# Patient Record
Sex: FEMALE | Race: AMERICAN INDIAN OR ALASKA NATIVE | NOT HISPANIC OR LATINO | Employment: OTHER | URBAN - METROPOLITAN AREA
[De-identification: names, ages, dates, MRNs, and addresses within clinical notes are randomized per-mention and may not be internally consistent; named-entity substitution may affect disease eponyms.]

---

## 2020-06-23 ENCOUNTER — TELEPHONE (OUTPATIENT)
Dept: FAMILY MEDICINE | Facility: CLINIC | Age: 80
End: 2020-06-23

## 2020-06-23 ENCOUNTER — OFFICE VISIT (OUTPATIENT)
Dept: FAMILY MEDICINE | Facility: CLINIC | Age: 80
End: 2020-06-23
Payer: MEDICARE

## 2020-06-23 VITALS
BODY MASS INDEX: 23.04 KG/M2 | HEIGHT: 63 IN | OXYGEN SATURATION: 95 % | HEART RATE: 81 BPM | DIASTOLIC BLOOD PRESSURE: 60 MMHG | RESPIRATION RATE: 20 BRPM | SYSTOLIC BLOOD PRESSURE: 136 MMHG | WEIGHT: 130 LBS | TEMPERATURE: 98 F

## 2020-06-23 DIAGNOSIS — E03.9 HYPOTHYROIDISM, UNSPECIFIED TYPE: ICD-10-CM

## 2020-06-23 DIAGNOSIS — F03.91 DEMENTIA WITH BEHAVIORAL DISTURBANCE, UNSPECIFIED DEMENTIA TYPE: ICD-10-CM

## 2020-06-23 DIAGNOSIS — Z76.89 ENCOUNTER TO ESTABLISH CARE: Primary | ICD-10-CM

## 2020-06-23 DIAGNOSIS — L29.9 ITCHING: ICD-10-CM

## 2020-06-23 DIAGNOSIS — N30.00 ACUTE CYSTITIS WITHOUT HEMATURIA: ICD-10-CM

## 2020-06-23 DIAGNOSIS — E78.5 HYPERLIPIDEMIA, UNSPECIFIED HYPERLIPIDEMIA TYPE: ICD-10-CM

## 2020-06-23 DIAGNOSIS — D64.9 ANEMIA, UNSPECIFIED TYPE: ICD-10-CM

## 2020-06-23 DIAGNOSIS — L30.4 INTERTRIGO: ICD-10-CM

## 2020-06-23 PROCEDURE — 99999 PR PBB SHADOW E&M-EST. PATIENT-LVL III: CPT | Mod: PBBFAC,,, | Performed by: INTERNAL MEDICINE

## 2020-06-23 PROCEDURE — 99213 OFFICE O/P EST LOW 20 MIN: CPT | Mod: PBBFAC,PO | Performed by: INTERNAL MEDICINE

## 2020-06-23 PROCEDURE — 99999 PR PBB SHADOW E&M-EST. PATIENT-LVL III: ICD-10-PCS | Mod: PBBFAC,,, | Performed by: INTERNAL MEDICINE

## 2020-06-23 PROCEDURE — 99203 OFFICE O/P NEW LOW 30 MIN: CPT | Mod: S$PBB,,, | Performed by: INTERNAL MEDICINE

## 2020-06-23 PROCEDURE — 99203 PR OFFICE/OUTPT VISIT, NEW, LEVL III, 30-44 MIN: ICD-10-PCS | Mod: S$PBB,,, | Performed by: INTERNAL MEDICINE

## 2020-06-23 RX ORDER — LORATADINE 10 MG/1
10 TABLET ORAL DAILY
Qty: 90 TABLET | Refills: 3 | Status: SHIPPED | OUTPATIENT
Start: 2020-06-23 | End: 2021-04-05

## 2020-06-23 RX ORDER — FLUCONAZOLE 150 MG/1
TABLET ORAL
Qty: 1 TABLET | Refills: 0 | Status: SHIPPED | OUTPATIENT
Start: 2020-06-23

## 2020-06-23 RX ORDER — LORATADINE 10 MG/1
10 TABLET ORAL DAILY
COMMUNITY
End: 2020-06-23 | Stop reason: SDUPTHER

## 2020-06-23 RX ORDER — DONEPEZIL HYDROCHLORIDE 10 MG/1
TABLET, FILM COATED ORAL
COMMUNITY
Start: 2020-04-15

## 2020-06-23 RX ORDER — NYSTATIN 100000 [USP'U]/G
POWDER TOPICAL 2 TIMES DAILY
Qty: 60 G | Refills: 2 | Status: SHIPPED | OUTPATIENT
Start: 2020-06-23 | End: 2020-08-30 | Stop reason: SDUPTHER

## 2020-06-23 RX ORDER — NAPROXEN 500 MG/1
TABLET ORAL
COMMUNITY
Start: 2020-06-10

## 2020-06-23 NOTE — PROGRESS NOTES
Subjective:       Patient ID: Adri Samayoa is a pleasant 79 y.o. Black or  female patient    Chief Complaint: Follow-up, Establish Care, and Rash (Buttock area)      Patient is a new pt to me at our practice, [facility] one year [facility]. She was recently NOS for 2 appts at our Clinic.     HPI     In the interval:  - [facility] LE (Dr. De Luna).  - Dr. Jett, [facility], on 6/24/2019 to establish care.   - one visit at ED in 12/2019 for venous stasis dermatitis of both LE.  She comes today with her granddaughter. She is in a wheelchair.  She is not really verbal but can smile.    The granddaughter reports that starting 1 month ago the patient has been presenting with skin lesions on the buttocks that have been worsening. She applied topical steroid cream but the lesions have been increasing. The patient does not complain of pain but it seems that it is itchy.  The lesions on both chins are healed.   The granddaughter would like full blood work done today.  She also asks for a refill for antihistamine.      Patient Active Problem List   Diagnosis    Intertrigo    Hypothyroidism    Anemia    Hyperlipidemia        ACTIVE MEDICAL ISSUES:  Documented in Problem List     PAST MEDICAL HISTORY  Documented     PAST SURGICAL HISTORY:  Documented     SOCIAL HISTORY:  Documented     FAMILY HISTORY:  Documented     ALLERGIES AND MEDICATIONS: updated and reviewed.  Documented    Review of Systems   Unable to perform ROS: Dementia       Objective:      Physical Exam  Vitals signs and nursing note reviewed.   Constitutional:       Comments: Frail looking, in a wheelchair, well groomed   Cardiovascular:      Rate and Rhythm: Normal rate and regular rhythm.      Pulses: Normal pulses.      Heart sounds: Normal heart sounds.   Pulmonary:      Effort: Pulmonary effort is normal.      Breath sounds: Normal breath sounds.   Skin:     Comments: Very large area  "of rash on buttocks, wet, reddish, no open wound.          Vitals:    06/23/20 1515   BP: 136/60   BP Location: Left arm   Patient Position: Sitting   BP Method: Medium (Manual)   Pulse: 81   Resp: 20   Temp: 98.2 °F (36.8 °C)   TempSrc: Oral   SpO2: 95%   Weight: 59 kg (130 lb)   Height: 5' 3" (1.6 m)     Body mass index is 23.03 kg/m².    RESULTS: Reviewed labs from last 12 months.    Assessment:       1. Establishing care with new doctor, encounter for    2. Intertrigo    3. Hypothyroidism, unspecified type    4. Anemia, unspecified type    5. Hyperlipidemia, unspecified hyperlipidemia type        Plan:   Adri was seen today for follow-up, establish care and rash.    Diagnoses and all orders for this visit:    Encounter to establish care  -     CBC auto differential; Future  -     CBC auto differential    Pt I saw at some point at [facility], see notes in Epic. She also saw another PCP in the interval. Will reassess her whole situation.    Intertrigo  -     Ambulatory referral/consult to Dermatology; Future  -     fluconazole (DIFLUCAN) 150 MG Tab; Take 1 tablet  -     nystatin (MYCOSTATIN) powder; Apply topically 2 (two) times daily.    Presence of intertrigo on buttocks, widespread. Could not have the patient stand long enough for me to make a picture, Will refer to Dermatology. For now will order one pill of fluconazole (don't want to give too much due to the risk of interactions with Alzheimer meds) and nystatin powder. Advised caregiver to make sure to keep the area as dry as possible, she will let the pt stay on her side in her bed with exposed area to let it dry. No long stays in a chair or wheelchair.    Dementia with behavioral disturbance, unspecified dementia type     Pt assessed by Neurology, on treatment of Aricept, stable.    Hypothyroidism, unspecified type  -     TSH; Future  -     TSH    Will check level.    Anemia, unspecified type  -     CBC auto differential; Future  -     Comprehensive metabolic " panel; Future  -     CBC auto differential  -     Comprehensive metabolic panel    Will reassess.    Hyperlipidemia, unspecified hyperlipidemia type  -     Lipid Panel; Future  -     Lipid Panel    Will check lipid panel.    No follow-ups on file.

## 2020-06-23 NOTE — TELEPHONE ENCOUNTER
----- Message from Suzanne Curtis sent at 6/23/2020  2:55 PM CDT -----  Regarding: Running Late  Name of Who is Calling :     What is the request in detail :    Patient is running late for scheduled appointment and is enroute patient states he/she will be ( 10 minutes ) late looking for location.                                   ## PLEASE CONTACT IF PATIENT NEEDS TO RESCEDULE##                           Can the clinic reply by [facility] : No    What Number to Call Back :  [phone]

## 2020-06-24 PROBLEM — F03.918 DEMENTIA WITH BEHAVIORAL DISTURBANCE: Status: ACTIVE | Noted: 2020-06-24

## 2020-06-25 ENCOUNTER — OFFICE VISIT (OUTPATIENT)
Dept: DERMATOLOGY | Facility: CLINIC | Age: 80
End: 2020-06-25
Payer: MEDICARE

## 2020-06-25 DIAGNOSIS — L30.9 ECZEMA, UNSPECIFIED TYPE: ICD-10-CM

## 2020-06-25 DIAGNOSIS — B35.4 TINEA CORPORIS: Primary | ICD-10-CM

## 2020-06-25 DIAGNOSIS — L30.4 INTERTRIGO: ICD-10-CM

## 2020-06-25 PROCEDURE — 99202 OFFICE O/P NEW SF 15 MIN: CPT | Mod: 25,S$PBB,, | Performed by: DERMATOLOGY

## 2020-06-25 PROCEDURE — 99213 OFFICE O/P EST LOW 20 MIN: CPT | Mod: PBBFAC | Performed by: DERMATOLOGY

## 2020-06-25 PROCEDURE — 99202 PR OFFICE/OUTPT VISIT, NEW, LEVL II, 15-29 MIN: ICD-10-PCS | Mod: 25,S$PBB,, | Performed by: DERMATOLOGY

## 2020-06-25 PROCEDURE — 99999 PR PBB SHADOW E&M-EST. PATIENT-LVL III: ICD-10-PCS | Mod: PBBFAC,,, | Performed by: DERMATOLOGY

## 2020-06-25 PROCEDURE — 99999 PR PBB SHADOW E&M-EST. PATIENT-LVL III: CPT | Mod: PBBFAC,,, | Performed by: DERMATOLOGY

## 2020-06-25 RX ORDER — TERBINAFINE HYDROCHLORIDE 250 MG/1
250 TABLET ORAL DAILY
Qty: 30 TABLET | Refills: 0 | Status: SHIPPED | OUTPATIENT
Start: 2020-06-25 | End: 2020-07-25

## 2020-06-25 NOTE — PATIENT INSTRUCTIONS
Terbinafine that it inhibits P450 CYP pathway, patient is not on any drugs that would cause interference such as warfarin, OCPs, also that to watch the amount of caffeine intake as caffeine is not metabolized as fast as due to terbinafine and stays around longer in patient system.     - Use cerave anti- itch cream as often a needed put in fridge. Ice will help with itch as well.       -Recommend white vinegar: water 1:1 compresses 2x/day followed by cool blow dry and then application of prescription medication, then use zeabsorb AF powder        XEROSIS (DRY SKIN)        1. Definition    Xerosis is the term for dry skin.  We all have a natural oil coating over our skin produced by the skin oil glands.  If this oil is removed, the skin becomes dry which can lead to cracking, which can lead to inflammation.  Xerosis is usually a long-term problem that recurs often, especially in the winter.    2. Cause     Long hot baths or showers can remove our natural oil and lead to xerosis.  One should never take more than one bath or shower a day and for no longer than ten minutes.   Use of harsh soaps such as Zest, Dial, and Ivory can worsen and cause xerosis.   Cold winter weather worsens xerosis because the amount of moisture contained in cold air is much less than the amount of moisture in warm air.    3. Treatment     Treatment is intended to restore the natural oil to your skin.  Keep the skin lubricated.     Do not take more than one bath or shower a day.  Use lukewarm water, not hot.  Hot water dries out the skin.     Use a gentle moisturizing soap such as Cetaphil soap, Oil of Olay, Dove, Basis, Ivory moisture care, Restoraderm cleanser.     When toweling dry, dont rub.  Blot the skin so there is still some water left on the skin.  You should apply a moisturizing cream to all of the skin such as Cerave cream, Cetaphil cream, Restoraderm or Eucerin Original Formula cream.   Alpha hydroxyacid lotions, i.e.,  AmLactin, also work very well for preventing dry skin, but may burn when used on inflamed or reddened skin.     If you like to swim during the winter months, you should not use soap when getting out of the pool.  When you have finished swimming, rinse off the chlorine with cool to warm water.  If this will be the only shower of the day, then you may use Cetaphil or another mild soap to cleanse your skin.  After the shower, apply a moisturizing cream to all of the skin as above.        [address] [place] [address]/ [phone] [email or web address]/ [email or web address]          Terbinafine tablets  What is this medicine?  TERBINAFINE (TER bin a feen) is an antifungal medicine. It is used to treat certain kinds of fungal or yeast infections.  How should I use this medicine?  Take this medicine by mouth with a full glass of water. Follow the directions on the prescription label. You can take this medicine with food or on an empty stomach. Take your medicine at regular intervals. Do not take your medicine more often than directed. Do not skip doses or stop your medicine early even if you feel better. Do not stop taking except on your doctor's advice. Talk to your pediatrician regarding the use of this medicine in children. Special care may be needed.  What side effects may I notice from receiving this medicine?  Side effects that you should report to your doctor or health care professional as soon as possible:  · allergic reactions like skin rash or hives, swelling of the face, lips, or tongue  · changes in vision  · dark urine  · fever or infection  · general ill feeling or flu-like symptoms  · light-colored stools  · loss of appetite, nausea  · redness, blistering, peeling or loosening of the skin, including inside the mouth  · right upper belly pain  · unusually weak or tired  · yellowing of the eyes or skin  Side effects that usually do not require medical attention (report to your doctor or health care  professional if they continue or are bothersome):  · changes in taste  · diarrhea  · hair loss  · muscle or joint pain  · stomach gas  · stomach upset  What may interact with this medicine?  Do not take this medicine with any of the following medications:  · thioridazine  This medicine may also interact with the following medications:  · beta-blockers  · caffeine  · cimetidine  · cyclosporine  · medicines for depression, anxiety, or psychotic disturbances  · medicines for fungal infections like fluconazole and ketoconazole  · medicines for irregular heartbeat like amiodarone, flecainide and propafenone  · rifampin  · warfarin  What if I miss a dose?  If you miss a dose, take it as soon as you can. If it is almost time for your next dose, take only that dose. Do not take double or extra doses.  Where should I keep my medicine?  Keep out of the reach of children.  Store at room temperature below 25 degrees C (77 degrees F). Protect from light. Throw away any unused medicine after the expiration date.  What should I tell my health care provider before I take this medicine?  They need to know if you have any of these conditions:  · drink alcoholic beverages  · kidney disease  · liver disease  · an unusual or allergic reaction to terbinafine, other medicines, foods, dyes, or preservatives  · pregnant or trying to get pregnant  · breast-feeding  What should I watch for while using this medicine?  Visit your doctor or health care provider regularly. Tell your doctor right away if you have nausea or vomiting, loss of appetite, stomach pain on your right upper side, yellow skin, dark urine, light stools, or are over tired. Some fungal infections need many weeks or months of treatment to cure. If you are taking this medicine for a long time, you will need to have important blood work done.  NOTE:This sheet is a summary. It may not cover all possible information. If you have questions about this medicine, talk to your doctor,  pharmacist, or health care provider. Copyright© 2017 [employer]

## 2020-06-25 NOTE — PROGRESS NOTES
Subjective:       Patient ID:  Adri Samayoa is a 79 y.o. female who presents for   Chief Complaint   Patient presents with    Rash     buttock      HPI     Pt present for rash on butock, X 3 weeks, spreading, itching , tx Hydrocortisone     Review of Systems   Constitutional: Negative for fever, chills, weight loss, weight gain, fatigue, night sweats and malaise.   Skin: Negative for daily sunscreen use, activity-related sunscreen use and wears hat.   Hematologic/Lymphatic: Bruises/bleeds easily.        Objective:    Physical Exam   Constitutional: She appears well-developed and well-nourished.   Neurological: She is alert and oriented to person, place, and time.   Psychiatric: She has a normal mood and affect.   Skin:   Areas Examined (abnormalities noted in diagram):   Back Inspection Performed  RUE Inspected  LUE Inspection Performed              Diagram Legend     Erythematous scaling macule/papule c/w actinic keratosis       Vascular papule c/w angioma      Pigmented verrucoid papule/plaque c/w seborrheic keratosis      Yellow umbilicated papule c/w sebaceous hyperplasia      Irregularly shaped tan macule c/w lentigo     1-2 mm smooth white papules consistent with Milia      Movable subcutaneous cyst with punctum c/w epidermal inclusion cyst      Subcutaneous movable cyst c/w pilar cyst      Firm pink to brown papule c/w dermatofibroma      Pedunculated fleshy papule(s) c/w skin tag(s)      Evenly pigmented macule c/w junctional nevus     Mildly variegated pigmented, slightly irregular-bordered macule c/w mildly atypical nevus      Flesh colored to evenly pigmented papule c/w intradermal nevus       Pink pearly papule/plaque c/w basal cell carcinoma      Erythematous hyperkeratotic cursted plaque c/w SCC      Surgical scar with no sign of skin cancer recurrence      Open and closed comedones      Inflammatory papules and pustules      Verrucoid papule consistent consistent with wart     Erythematous  eczematous patches and plaques     Dystrophic onycholytic nail with subungual debris c/w onychomycosis     Umbilicated papule    Erythematous-base heme-crusted tan verrucoid plaque consistent with inflamed seborrheic keratosis     Erythematous Silvery Scaling Plaque c/w Psoriasis     See annotation      Assessment / Plan:        Tinea corporis  -     terbinafine HCL (LAMISIL) 250 mg tablet; Take 1 tablet (250 mg total) by mouth once daily.  Dispense: 30 tablet; Refill: 0    Counseled patient on terbinafine that it inhibits P450 CYP pathway, patient is not on any drugs that would cause interference such as warfarin, OCPs, also that to watch the amount of caffeine intake as caffeine is not metabolized as fast as due to terbinafine and stays around longer in patient system. Handout given and discussed in avs    -Recommend white vinegar: water 1:1 compresses 2x/day followed by cool blow dry and then application of prescription medication, then use zeabsorb AF powder    Intertrigo  -     Ambulatory referral/consult to Dermatology    Eczema, unspecified type  Good skin care regimen discussed including limiting to one bath or shower/day, using lukewarm water with mild soap and moisturizing cream to skin 1 - 2x/day. Brochure was provided and reviewed with patient.  - Use cerave anti- itch cream as often a needed put in fridge. Ice will help with itch as well.                  No follow-ups on file.

## 2020-06-25 NOTE — LETTER
June 25, 2020      Carolina Krishnamurthy MD  [address] [place] [address]           [facility] - Dermatology  [address]  [place] [address]  Phone: [phone]  Fax: [phone]          Patient: Adri Samayoa   MR Number: [id number]   YOB: 1940   Date of Visit: 6/25/2020       Dear Dr. Carolina Krishnamurthy:    Thank you for referring Adri Samayoa to me for evaluation. Attached you will find relevant portions of my assessment and plan of care.    If you have questions, please do not hesitate to call me. I look forward to following Adri Samayoa along with you.    Sincerely,    Vicki Vidales MD    Enclosure  CC:  No Recipients    If you would like to receive this communication electronically, please contact [email or web address] or [phone] to request more information on [employer] Link access.    For providers and/or their staff who would like to refer a patient to [facility], please contact us through our one-stop-shop provider referral line, [facility], at [phone].    If you feel you have received this communication in error or would no longer like to receive these types of communications, please e-mail [email or web address]

## 2020-07-28 DIAGNOSIS — L30.4 INTERTRIGO: ICD-10-CM

## 2020-07-28 DIAGNOSIS — L29.9 ITCHING: ICD-10-CM

## 2020-07-28 RX ORDER — NYSTATIN 100000 [USP'U]/G
POWDER TOPICAL 2 TIMES DAILY
Qty: 60 G | Refills: 2 | Status: CANCELLED | OUTPATIENT
Start: 2020-07-28

## 2020-07-28 RX ORDER — LORATADINE 10 MG/1
10 TABLET ORAL DAILY
Qty: 90 TABLET | Refills: 3 | Status: CANCELLED | OUTPATIENT
Start: 2020-07-28

## 2020-08-03 ENCOUNTER — TELEPHONE (OUTPATIENT)
Dept: FAMILY MEDICINE | Facility: CLINIC | Age: 80
End: 2020-08-03

## 2020-08-03 NOTE — TELEPHONE ENCOUNTER
----- Message from Carolina Krishnamurthy MD sent at 8/3/2020  5:03 PM CDT -----  Regarding: RE: Pt Advice  Contact: Lauri Valdez (daughter)  Can you see what is going on? Thanks  ----- Message -----  From: Sekou Miranda  Sent: 8/3/2020  10:02 AM CDT  To: Carolina Krishnamurthy MD  Subject: Pt Advice                                        Name of Who is Calling: Lauri Valdez (daughter)      What is the request in detail: would like to speak with staff in regards to patient making frequent bath room trips, please advise      Can the clinic reply by [facility]: no      What Number [facility] Call Back [facility]: [phone]

## 2020-08-03 NOTE — TELEPHONE ENCOUNTER
The patient is very anxious and nervous. She is concern about the possibility of Covid. She has complaints of tingling and numbness to her head. We recommended ER. The patient states that she is not going to ER. She was informed to go to a community testing site to be tested if that is her preference.    We would need to have a urine sample. Is it possible? Thanks

## 2020-08-03 NOTE — TELEPHONE ENCOUNTER
Spoke with pts daughter in regards to message. States that pt is going to the restroom more frequent than normal, but states that she is not emptying all the way. States that she has the urge to go, but isn't. Pt denies abdominal pain, denies odor to urine, states that urine is its normal color, and is not cloudy.. pt denies any recent trauma/falls. Denies feeling weak or dizzy, states that her fluid intake is about the same.

## 2020-08-06 ENCOUNTER — TELEPHONE (OUTPATIENT)
Dept: FAMILY MEDICINE | Facility: CLINIC | Age: 80
End: 2020-08-06

## 2020-08-06 NOTE — TELEPHONE ENCOUNTER
What is the request in detail: Meseret, daughter of the patient is requesting a call back from Ms. Whitt. She stated that the patient uses the restroom frequently, but only a little comes out at a time, like a UTI. She does not report any burning. She also stated that she is having a hard time getting the urine specimen, due to the patient having dementia.

## 2020-08-06 NOTE — TELEPHONE ENCOUNTER
I understand it is difficult for the daughter to collect the urine. I could try to give the pt a course of Bactrim for 5 days and see if it helps. Far from ideal but possibly the best option. Would the daughter like me to send this? Did the pt received this antibiotic in the past? I don't see any allergy to Bactrim in her list. Thanks

## 2020-08-06 NOTE — TELEPHONE ENCOUNTER
----- Message from Miguel Dustin sent at 8/6/2020 12:20 PM CDT -----  Regarding: call back  Type: Patient Call Back    Who called:Meseret     What is the request in detail: Meseret, daughter of the patient is requesting a call back from Ms. Whitt. She stated that the patient uses the restroom frequently, but only a little comes out at a time, like a UTI. She does not report any burning. She also stated that she is having a hard time getting the urine specimen, due to the patient having dementia.    Can the clinic reply by [facility]?no    Would the patient rather a call back or a response via [facility]? Call back     Best call back number:[phone]

## 2020-08-07 RX ORDER — SULFAMETHOXAZOLE AND TRIMETHOPRIM 800; 160 MG/1; MG/1
1 TABLET ORAL 2 TIMES DAILY
Qty: 10 TABLET | Refills: 0 | Status: SHIPPED | OUTPATIENT
Start: 2020-08-07 | End: 2020-08-12

## 2020-08-07 NOTE — TELEPHONE ENCOUNTER
Daughter returned call; she would like you to send in the antibiotics to see if it will help, states pt allergic to PCN

## 2020-08-14 DIAGNOSIS — Z11.59 NEED FOR HEPATITIS C SCREENING TEST: ICD-10-CM

## 2020-12-01 DIAGNOSIS — L30.4 INTERTRIGO: Primary | ICD-10-CM

## 2020-12-01 NOTE — TELEPHONE ENCOUNTER
"----- Message from Karli Lauren sent at 12/1/2020 10:08 AM CST -----  Contact: Adri"daughter" [phone]  Type: Patient Call Back    Who called: Adri"daughter"    What is the request in detail: calling to get orders for a wheelchair sent over the insurance company    Can the clinic reply by [facility]? Call back    Would the patient rather a call back or a response via [facility]? Call back    Best call back number: [phone]            "

## 2020-12-01 NOTE — TELEPHONE ENCOUNTER
I thought she had one already as per last visit.  Is it broken? Also, not seen from June, should be seen again this month. Thanks

## 2020-12-02 NOTE — TELEPHONE ENCOUNTER
Per pts daughter they did not get wheelchair from her insurance, it was one family had been paying for, they are requesting orders be sent to DME for pt to get wheelchair through her insurance because they can't afford to pay for it anymore; also requesting Triamcinolone cream to buttocks rash

## 2020-12-03 ENCOUNTER — TELEPHONE (OUTPATIENT)
Dept: FAMILY MEDICINE | Facility: CLINIC | Age: 80
End: 2020-12-03

## 2020-12-03 RX ORDER — TRIAMCINOLONE ACETONIDE 5 MG/G
CREAM TOPICAL 2 TIMES DAILY
Qty: 15 G | Refills: 1 | OUTPATIENT
Start: 2020-12-03

## 2020-12-03 NOTE — TELEPHONE ENCOUNTER
----- Message from Justine Burt sent at 12/3/2020  9:49 AM CST -----  Type:  Patient Returning Call    Who Called:  Daughter- Adri    Who Left Message for Patient:  Marija    Does the patient know what this is regarding?: previous call     Would the patient rather a call back or a response via [facility]?  Call     Best Call Back Number:. [phone]

## 2020-12-03 NOTE — TELEPHONE ENCOUNTER
Per previous message triamcinolone cream is for rash to pts buttocks, she doesn't have a prescription, daughter was using her medication on patient and it helps so she would like medication sent to pharmacy  Pt was last here on 6/23/20, can you use the height and weight from then for wheelchair order, per message from yesterday daughter stated that wheelchair was not provided to pt through her insurance, it is one family has been paying for and they cannot afford it and would like order to be sent to [employer] company for insurance to process

## 2020-12-03 NOTE — TELEPHONE ENCOUNTER
Please see who ordered the triamcinolone first. I cannot find it. Why is it for?For the wheelchair, I put a message to pool yesterday to know if her former wheelchair broke. I would need to know the pt's present weight and height. Thanks

## 2020-12-03 NOTE — TELEPHONE ENCOUNTER
----- Message from Rufina Barry sent at 12/3/2020 12:58 PM CST -----  Type: Patient Call Back    Who called: pt daughter  Adri     What is the request in detail: pt daughter would like to speak with someone about her mothers wheelchair, and also would like to get a refill on Triamcinolone cream .     Can the clinic reply by [facility]? No     Would the patient rather a call back or a response via [facility]?  Call back     Best call back number: [phone]    Additional Information:      Thank You

## 2020-12-09 ENCOUNTER — TELEPHONE (OUTPATIENT)
Dept: FAMILY MEDICINE | Facility: CLINIC | Age: 80
End: 2020-12-09

## 2020-12-09 DIAGNOSIS — F03.91 DEMENTIA WITH BEHAVIORAL DISTURBANCE, UNSPECIFIED DEMENTIA TYPE: Primary | ICD-10-CM

## 2020-12-09 NOTE — TELEPHONE ENCOUNTER
----- Message from Darlene Cantor sent at 12/9/2020  2:59 PM CST -----  Contact: Fadia Rush [phone]  Type:  Patient Returning Call    Who Called: Fadia Rush    Who Left Message for Patient: Not sure     Does the patient know what this is regarding?: Returning call.    Would the patient rather a call back or a response via [facility]? Call back    Best Call Back Number: [phone]

## 2020-12-09 NOTE — TELEPHONE ENCOUNTER
Daughter called back to check on status of below; she is needing cream sent in for rash to buttocks and also order for wheelchair to be sent to DME

## 2020-12-09 NOTE — TELEPHONE ENCOUNTER
I ordered a wheelchair at the best of my knowledge. For the cream, I am not willing to order triamcinolone. Pt seen by me only once in 6/2020, with severe intertrigo. Steroids may worsen the picture. Does she have HH that could assess? Thanks

## 2020-12-09 NOTE — TELEPHONE ENCOUNTER
Pt's daughter states that that particular cream(triamcinolone) was working, but if PCP thinks that this cream is not a good idea, please something else in its place.

## 2021-07-07 ENCOUNTER — PATIENT MESSAGE (OUTPATIENT)
Dept: ADMINISTRATIVE | Facility: HOSPITAL | Age: 81
End: 2021-07-07

## 2021-10-04 ENCOUNTER — PATIENT MESSAGE (OUTPATIENT)
Dept: ADMINISTRATIVE | Facility: HOSPITAL | Age: 81
End: 2021-10-04

## 2022-01-18 ENCOUNTER — PATIENT OUTREACH (OUTPATIENT)
Dept: ADMINISTRATIVE | Facility: HOSPITAL | Age: 82
End: 2022-01-18
Payer: MEDICARE

## 2022-05-02 ENCOUNTER — PES CALL (OUTPATIENT)
Dept: ADMINISTRATIVE | Facility: CLINIC | Age: 82
End: 2022-05-02
Payer: MEDICARE

## 2022-05-23 DIAGNOSIS — L29.9 ITCHING: ICD-10-CM

## 2022-05-23 NOTE — TELEPHONE ENCOUNTER
Refill Routing Note   Medication(s) are not appropriate for processing by [facility] for the following reason(s):      - Patient has not been seen in over 15 months by PCP               Medication reconciliation completed: No     Appointments  past 12m or future 3m with PCP    Date Provider   Last Visit   6/23/2020 Carolina Krishnamurthy MD   Next Visit   Visit date not found Carolina Krishnamurthy MD

## 2022-05-23 NOTE — TELEPHONE ENCOUNTER
No new care gaps identified.  [facility] Embedded Care Gaps. Reference number: [id number]. 5/23/2022   4:14:55 AM JOSEFT

## 2022-05-25 RX ORDER — LORATADINE 10 MG/1
TABLET ORAL
Qty: 90 TABLET | Refills: 0 | Status: SHIPPED | OUTPATIENT
Start: 2022-05-25 | End: 2023-07-06

## 2022-05-30 ENCOUNTER — PATIENT MESSAGE (OUTPATIENT)
Dept: ADMINISTRATIVE | Facility: HOSPITAL | Age: 82
End: 2022-05-30
Payer: MEDICARE

## 2022-08-22 ENCOUNTER — PES CALL (OUTPATIENT)
Dept: ADMINISTRATIVE | Facility: OTHER | Age: 82
End: 2022-08-22
Payer: MEDICARE

## 2023-01-18 ENCOUNTER — PES CALL (OUTPATIENT)
Dept: ADMINISTRATIVE | Facility: OTHER | Age: 83
End: 2023-01-18
Payer: MEDICARE

## 2023-01-30 ENCOUNTER — PATIENT OUTREACH (OUTPATIENT)
Dept: ADMINISTRATIVE | Facility: HOSPITAL | Age: 83
End: 2023-01-30
Payer: MEDICARE

## 2023-05-24 ENCOUNTER — PATIENT OUTREACH (OUTPATIENT)
Dept: ADMINISTRATIVE | Facility: HOSPITAL | Age: 83
End: 2023-05-24
Payer: MEDICARE

## 2023-05-24 NOTE — PROGRESS NOTES
HM and immunization's reviewed and updated. Due for a visit with Dr Krishnamurthy. See [facility] no longer with [facility]. Do not know name spoke with daughter.

## 2023-07-06 DIAGNOSIS — L29.9 ITCHING: ICD-10-CM

## 2023-07-06 RX ORDER — LORATADINE 10 MG/1
TABLET ORAL
Qty: 90 TABLET | Refills: 0 | Status: SHIPPED | OUTPATIENT
Start: 2023-07-06 | End: 2023-09-08

## 2023-07-06 NOTE — TELEPHONE ENCOUNTER
Refill Routing Note   Medication(s) are not appropriate for processing by [facility] for the following reason(s):      Patient not seen by PCP within 15 months  Responsible provider unclear    [facility] action(s):  Defer Care Due:  None identified          Appointments  past 12m or future 3m with PCP    Date Provider   Last Visit   6/23/2020 Carolina Krishnamurthy MD   Next Visit   Visit date not found Carolina Krishnamurthy MD   ED visits in past 90 days: 0        Note composed:2:14 PM 07/06/2023

## 2023-09-07 DIAGNOSIS — L29.9 ITCHING: ICD-10-CM

## 2023-09-08 RX ORDER — LORATADINE 10 MG/1
TABLET ORAL
Qty: 90 TABLET | Refills: 0 | Status: SHIPPED | OUTPATIENT
Start: 2023-09-08 | End: 2023-12-11

## 2023-09-08 NOTE — TELEPHONE ENCOUNTER
Refill Routing Note     Refill Routing Note   Medication(s) are not appropriate for processing by [facility] for the following reason(s):      Responsible provider unclear    [facility] action(s):  Defer Care Due:  None identified     Medication Therapy Plan: No PCP listed      Appointments  past 12m or future 3m with PCP    Date Provider   Last Visit   6/23/2020 Carolina Krishnamurthy MD   Next Visit   Visit date not found Carolina Krishnamurthy MD   ED visits in past 90 days: 0        Note composed:8:44 PM 09/07/2023

## 2023-12-08 DIAGNOSIS — L29.9 ITCHING: ICD-10-CM

## 2023-12-10 NOTE — TELEPHONE ENCOUNTER
Refill Routing Note   Medication(s) are not appropriate for processing by [facility] for the following reason(s):        Patient not seen by provider within 15 months  Responsible provider unclear    [facility] action(s):  Defer               Appointments  past 12m or future 3m with PCP    Date Provider   Last Visit   6/23/2020 Carolina Krishnamurthy MD   Next Visit   Visit date not found Carolina Krishnamurthy MD   ED visits in past 90 days: 0        Note composed:3:33 PM 12/10/2023

## 2023-12-11 RX ORDER — LORATADINE 10 MG/1
TABLET ORAL
Qty: 90 TABLET | Refills: 0 | Status: SHIPPED | OUTPATIENT
Start: 2023-12-11

## 2024-05-13 ENCOUNTER — OFFICE VISIT (OUTPATIENT)
Facility: CLINIC | Age: 84
End: 2024-05-13
Payer: MEDICARE

## 2024-05-13 VITALS
TEMPERATURE: 98 F | OXYGEN SATURATION: 96 % | HEART RATE: 39 BPM | BODY MASS INDEX: 23.04 KG/M2 | WEIGHT: 130.06 LBS | DIASTOLIC BLOOD PRESSURE: 54 MMHG | SYSTOLIC BLOOD PRESSURE: 117 MMHG

## 2024-05-13 DIAGNOSIS — E03.9 ACQUIRED HYPOTHYROIDISM: ICD-10-CM

## 2024-05-13 DIAGNOSIS — F03.918 DEMENTIA WITH BEHAVIORAL DISTURBANCE: ICD-10-CM

## 2024-05-13 DIAGNOSIS — R00.1 BRADYCARDIA, PRE-OPERATIVE CARDIOVASCULAR EXAMINATION: ICD-10-CM

## 2024-05-13 DIAGNOSIS — R00.1 BRADYCARDIA: Primary | ICD-10-CM

## 2024-05-13 DIAGNOSIS — Z01.810 BRADYCARDIA, PRE-OPERATIVE CARDIOVASCULAR EXAMINATION: ICD-10-CM

## 2024-05-13 DIAGNOSIS — F42.4 EXCORIATION (SKIN-PICKING) DISORDER: Chronic | ICD-10-CM

## 2024-05-13 PROCEDURE — 3078F DIAST BP <80 MM HG: CPT | Mod: CPTII,S$GLB,, | Performed by: HOSPITALIST

## 2024-05-13 PROCEDURE — 93010 ELECTROCARDIOGRAM REPORT: CPT | Mod: S$GLB,,, | Performed by: INTERNAL MEDICINE

## 2024-05-13 PROCEDURE — 99205 OFFICE O/P NEW HI 60 MIN: CPT | Mod: S$GLB,,, | Performed by: HOSPITALIST

## 2024-05-13 PROCEDURE — 1126F AMNT PAIN NOTED NONE PRSNT: CPT | Mod: CPTII,S$GLB,, | Performed by: HOSPITALIST

## 2024-05-13 PROCEDURE — 99999 PR PBB SHADOW E&M-EST. PATIENT-LVL III: CPT | Mod: PBBFAC,,, | Performed by: HOSPITALIST

## 2024-05-13 PROCEDURE — 93005 ELECTROCARDIOGRAM TRACING: CPT | Mod: S$GLB,,, | Performed by: HOSPITALIST

## 2024-05-13 PROCEDURE — 3074F SYST BP LT 130 MM HG: CPT | Mod: CPTII,S$GLB,, | Performed by: HOSPITALIST

## 2024-05-13 PROCEDURE — 1158F ADVNC CARE PLAN TLK DOCD: CPT | Mod: CPTII,S$GLB,, | Performed by: HOSPITALIST

## 2024-05-13 RX ORDER — CLOMIPRAMINE HCL 100 %
25 POWDER (GRAM) MISCELLANEOUS
COMMUNITY
Start: 2024-02-19 | End: 2024-05-13 | Stop reason: SDUPTHER

## 2024-05-13 RX ORDER — LATANOPROST 50 UG/ML
1 SOLUTION/ DROPS OPHTHALMIC NIGHTLY
COMMUNITY
Start: 2024-02-19

## 2024-05-13 RX ORDER — DORZOLAMIDE HYDROCHLORIDE AND TIMOLOL MALEATE 20; 5 MG/ML; MG/ML
1 SOLUTION/ DROPS OPHTHALMIC 2 TIMES DAILY
COMMUNITY
Start: 2024-02-26 | End: 2024-05-13

## 2024-05-13 RX ORDER — ALENDRONATE SODIUM 70 MG/1
70 TABLET ORAL
COMMUNITY
End: 2024-05-27 | Stop reason: SDUPTHER

## 2024-05-13 RX ORDER — CHOLECALCIFEROL (VITAMIN D3) 25 MCG
2000 TABLET ORAL DAILY
COMMUNITY
End: 2024-05-15 | Stop reason: SDUPTHER

## 2024-05-13 RX ORDER — MEMANTINE HYDROCHLORIDE 10 MG/1
10 TABLET ORAL 2 TIMES DAILY
COMMUNITY

## 2024-05-13 RX ORDER — CLOMIPRAMINE HCL 100 %
25 POWDER (GRAM) MISCELLANEOUS 2 TIMES DAILY
COMMUNITY
Start: 2024-02-16 | End: 2024-05-13 | Stop reason: SDUPTHER

## 2024-05-13 RX ORDER — CLOMIPRAMINE HYDROCHLORIDE 25 MG/1
25 CAPSULE ORAL DAILY
COMMUNITY
End: 2024-05-27 | Stop reason: SINTOL

## 2024-05-13 NOTE — PROGRESS NOTES
"Primary Care Provider Appointment - [facility]  Len Boyer MD  Initial Visit    Subjective:      Patient ID: Adri Samayoa is a 83 y.o. female with   Past Medical History:   Diagnosis Date    Dementia            Chief Complaint: Annual Exam and Establish Care    Prior to this visit, patient's last encounter with PCP was Visit date not found.    Pt accompanied by daughter Bethany who provides hx.  Was seeing a "Dr. S" at [facility] who left and they didn't want to start w/ a new dr there; this location is more convenient.  Needs medical clearance for dental procedures; insurance changed and prior dentist no longer in-network so they recently established w/ another dentist.  Sees a neurologist Dr. Godinez at [facility] for dementia.  Recently started on clomipramine for OCD w/ some mild improvement but makes her drowsy so not giving BID as prescribed.  On Namenda and Aricept.  Ambulates w/ walker in home, WC out of home.  Some difficulty getting to appointments.  Eating pureed diet; good appetite.  Sleeping well.  Occasional constipation but has PRNs that are effective.  Lives w/ Bethany; other daughter Evangelina helps as well.        4Ms for Medical Decision-Making in Older Adults    Last Completed EAWV: None    MOBILITY:  Get Up and Go:       No data to display              Activities of Daily Living:       No data to display              Whisper Test:       No data to display              Disability Status:       No data to display              Nutrition Screening:       No data to display             Screening Score: 0-7 Malnourished, 8-11 At Risk, 12-14 Normal    MENTATION:   Depression Patient Health Questionnaire:       No data to display              Has Dementia Dx: Yes    Cognitive Function Screening:       No data to display              Cognitive Function Screening Total - Less than 4 = Abnormal,  Greater than or equal to 4 = Normal    MEDICATIONS:  High Risk " Medications:  Total Active Medications: 0  This patient does not have an active medication from one of the medication groupers.    WHAT MATTERS MOST:  Advance Care Planning   ACP Status:   Patient does not have an ACP conversation on file  Living Will: No  Power of : No  [place]POST: No             Social History     Socioeconomic History    Marital status:    Tobacco Use    Smoking status: Unknown    Smokeless tobacco: Never       Review of Systems   Unable to perform ROS: Dementia        Objective:   BP (!) 117/54   Pulse (!) 39   Temp 98.1 °F (36.7 °C) (Oral)   Wt 59 kg (130 lb 1.1 oz)   SpO2 96%   BMI 23.04 kg/m²   Repeat pulse measured by myself 54.    Physical Exam  Vitals reviewed.   Constitutional:       General: She is not in acute distress.     Comments: Pt seen in WC   HENT:      Head: Normocephalic and atraumatic.      Right Ear: External ear normal. There is impacted cerumen.      Left Ear: External ear normal. There is impacted cerumen.      Nose: Nose normal.      Mouth/Throat:      Mouth: Mucous membranes are moist.      Pharynx: Oropharynx is clear.      Comments: Edentulous save for 3 teeth, which are in poor condition.  Eyes:      General: No scleral icterus.     Extraocular Movements: Extraocular movements intact.      Conjunctiva/sclera: Conjunctivae normal.      Pupils: Pupils are equal, round, and reactive to light.   Cardiovascular:      Rate and Rhythm: Regular rhythm. Bradycardia present.      Pulses: Normal pulses.      Heart sounds: Normal heart sounds.   Pulmonary:      Effort: Pulmonary effort is normal.      Breath sounds: Normal breath sounds.   Abdominal:      General: Bowel sounds are normal. There is no distension.      Tenderness: There is no abdominal tenderness. There is no guarding.   Musculoskeletal:      Cervical back: Normal range of motion and neck supple. No rigidity.      Right lower leg: Edema (mild pitting) present.      Left lower leg: Edema (mild  "pitting) present.   Lymphadenopathy:      Cervical: No cervical adenopathy.   Skin:     General: Skin is warm and dry.      Findings: No rash.   Neurological:      Mental Status: She is alert.      Comments: Oriented to self only.  Often needs prompting from daughter to respond to questions and then attends to speaker.  Able to follow some simple commands.  Slight "pill-rolling" tremor of right thumb and 2nd digit noted.      EKG done in office and reviewed personally shows heart rate 36, QTC in low 400s, right bundle branch block, and low amplitude.  No prior tracings available for comparison.      No results found for: "WBC", "HGB", "HCT", "PLT", "CHOL", "TRIG", "HDL", "LDLDIRECT", "ALT", "AST", "NA", "K", "CL", "CREATININE", "BUN", "CO2", "TSH", "PSA", "INR", "GLUF", "HGBA1C", "MICROALBUR"    Current Outpatient Medications on File Prior to Visit   Medication Sig Dispense Refill    alendronate (FOSAMAX) 70 MG tablet Take 70 mg by mouth every 7 days.      ARICEPT 10 mg tablet       clomiPRAMINE (ANAFRANIL) 25 MG Cap Take 25 mg by mouth once daily.      latanoprost 0.005 % ophthalmic solution Place 1 drop into both eyes every evening.      loratadine (CLARITIN) 10 mg tablet TAKE 1 TABLET(10 MG) BY MOUTH EVERY DAY 90 tablet 0    memantine (NAMENDA) 10 MG Tab Take 10 mg by mouth 2 (two) times daily.      naproxen (NAPROSYN) 500 MG tablet       vitamin D (VITAMIN D3) 1000 units Tab Take 2,000 Units by mouth once daily.      [DISCONTINUED] clomipramine HCl (CLOMIPRAMINE, BULK,) 100 % Powd Take 25 mg by mouth 2 (two) times daily.      [DISCONTINUED] clomipramine HCl (CLOMIPRAMINE, BULK,) 100 % Powd 25 mg by Other route.      [DISCONTINUED] dorzolamide-timolol 2-0.5% (COSOPT) 22.3-6.8 mg/mL ophthalmic solution Place 1 drop into both eyes 2 (two) times daily.      [DISCONTINUED] fluconazole (DIFLUCAN) 150 MG Tab Take 1 tablet 1 tablet 0    [DISCONTINUED] NYSTOP powder APPLY TOPICALLY TO THE AFFECTED AREA TWICE DAILY 60 g 2 "     No current facility-administered medications on file prior to visit.           Assessment:   83 y.o. female with multiple co-morbid illnesses here to establish care.    Plan:     Problem List Items Addressed This Visit       Excoriation (skin-picking) disorder (Chronic)     Recently started on clomipramine by neurologist for OCD with perhaps some slight improvement in behavioral symptoms.  Medication interactions and side effects reviewed with patient's daughter, who now feels risk outweighs benefits and wants to wean her off the medication.  She has already been opening the capsules to add to puree-consistency food, so she was advised to decrease the dose to half of a capsule daily for the next 2 weeks then half a capsule every other day for 2 weeks then stop.         Hypothyroidism     Not on levothyroxine.  We will check TSH along with other basic labs to determine if hypothyroidism could possibly be resulting in bradycardia.         Dementia with behavioral disturbance     Patient on both Namenda and Aricept; the former known to have bradycardia as a side effect.  Counseled patient's daughter on limited efficacy of these drugs and increased risk of side effects when used together.  She plans to discuss this at their next appointment with the neurologist.  She appears to be in the moderate stage of disease; somewhat withdrawn but appropriately interactive with prompting.  She is eating well but nutritional intake somewhat limited by need for pureed diet.  Her mobility is on the decline.         Bradycardia, pre-operative cardiovascular examination     Bradycardia confirmed on EKG.  She is asymptomatic.  She does not take any AV yves blocking agents; is possible 1 of her other medications could be contributing to this.  We will try stopping clomipramine has been cautioned above reassess promptly.  Patient's daughter counseled on risk versus benefit of aggressive management of bradycardia which may lead to  pacemaker placement which could be of limited utility and possibly result in harm.          Other Visit Diagnoses       Bradycardia    -  Primary    Relevant Orders    IN OFFICE EKG 12-LEAD (to [place]) (Completed)            Health Maintenance         Date Due Completion Date    TETANUS VACCINE Never done ---    Shingles Vaccine (1 of 2) Never done ---    RSV Vaccine (Age 60+ and Pregnant patients) (1 - 1-dose 60+ series) Never done ---    Pneumococcal Vaccines (Age 65+) (1 of 1 - PCV) Never done ---    COVID-19 Vaccine (3 - 2023-24 season) 09/01/2023 6/14/2021    Lipid Panel 06/24/2024 6/24/2019    Influenza Vaccine (Season Ended) 09/01/2024 ---            Future Appointments   Date Time Provider Department Center   5/13/2024  1:00 PM Len Boyer MD [facility]         Follow up in about 2 weeks (around 5/27/2024). Total clinical care time was 60 min.    Len Boyer MD  [facility]    This note was partially dictated using voice recognition software and although actively proofread during transcription, it is possible some errors in transcription may have been overlooked.

## 2024-05-14 PROBLEM — R00.1 BRADYCARDIA, PRE-OPERATIVE CARDIOVASCULAR EXAMINATION: Status: ACTIVE | Noted: 2024-05-14

## 2024-05-14 PROBLEM — Z01.810 BRADYCARDIA, PRE-OPERATIVE CARDIOVASCULAR EXAMINATION: Status: ACTIVE | Noted: 2024-05-14

## 2024-05-14 PROBLEM — L30.4 INTERTRIGO: Status: RESOLVED | Noted: 2020-06-23 | Resolved: 2024-05-14

## 2024-05-14 PROBLEM — F42.4 EXCORIATION (SKIN-PICKING) DISORDER: Chronic | Status: ACTIVE | Noted: 2024-05-14

## 2024-05-14 LAB
OHS QRS DURATION: 136 MS
OHS QTC CALCULATION: 408 MS

## 2024-05-14 NOTE — ASSESSMENT & PLAN NOTE
Patient on both Namenda and Aricept; the former known to have bradycardia as a side effect.  Counseled patient's daughter on limited efficacy of these drugs and increased risk of side effects when used together.  She plans to discuss this at their next appointment with the neurologist.  She appears to be in the moderate stage of disease; somewhat withdrawn but appropriately interactive with prompting.  She is eating well but nutritional intake somewhat limited by need for pureed diet.  Her mobility is on the decline.

## 2024-05-14 NOTE — ASSESSMENT & PLAN NOTE
Recently started on clomipramine by neurologist for OCD with perhaps some slight improvement in behavioral symptoms.  Medication interactions and side effects reviewed with patient's daughter, who now feels risk outweighs benefits and wants to wean her off the medication.  She has already been opening the capsules to add to puree-consistency food, so she was advised to decrease the dose to half of a capsule daily for the next 2 weeks then half a capsule every other day for 2 weeks then stop.

## 2024-05-15 ENCOUNTER — TELEPHONE (OUTPATIENT)
Dept: PRIMARY CARE CLINIC | Facility: CLINIC | Age: 84
End: 2024-05-15
Payer: MEDICARE

## 2024-05-15 DIAGNOSIS — M81.0 AGE-RELATED OSTEOPOROSIS WITHOUT CURRENT PATHOLOGICAL FRACTURE: Primary | ICD-10-CM

## 2024-05-15 RX ORDER — CHOLECALCIFEROL (VITAMIN D3) 25 MCG
2000 TABLET ORAL DAILY
Qty: 180 TABLET | Refills: 3 | Status: SHIPPED | OUTPATIENT
Start: 2024-05-15 | End: 2025-05-15

## 2024-05-15 NOTE — TELEPHONE ENCOUNTER
----- Message from Gretchen Doll sent at 5/15/2024 10:55 AM CDT -----  Contact: Bethany/Daughter [phone]  Patient has not received the following medication as of today.  Please put in a prescription today as the side effects as starting to come for not taking the over the counter ones.    Requesting an RX refill or new RX.  Is this a refill or new RX: NEW  RX name and strength (copy/paste from chart):  vitamin D (VITAMIN D3) 1000 units Tab  Is this a 30 day or 90 day RX: 90  Pharmacy name and phone # (copy/paste from chart):    [id number] [address] AT [address] [address]  Phone: [phone] Fax: [phone]      Please call and advise.    Thank You

## 2024-05-27 ENCOUNTER — OFFICE VISIT (OUTPATIENT)
Facility: CLINIC | Age: 84
End: 2024-05-27
Payer: MEDICARE

## 2024-05-27 VITALS
OXYGEN SATURATION: 97 % | TEMPERATURE: 99 F | DIASTOLIC BLOOD PRESSURE: 70 MMHG | SYSTOLIC BLOOD PRESSURE: 140 MMHG | HEART RATE: 31 BPM | WEIGHT: 130.06 LBS | BODY MASS INDEX: 23.04 KG/M2

## 2024-05-27 DIAGNOSIS — F03.918 DEMENTIA WITH BEHAVIORAL DISTURBANCE: ICD-10-CM

## 2024-05-27 DIAGNOSIS — L29.9 ITCHING: ICD-10-CM

## 2024-05-27 DIAGNOSIS — M79.601 PAIN OF RIGHT UPPER EXTREMITY: ICD-10-CM

## 2024-05-27 DIAGNOSIS — E78.5 HYPERLIPIDEMIA, UNSPECIFIED HYPERLIPIDEMIA TYPE: ICD-10-CM

## 2024-05-27 DIAGNOSIS — Z79.899 OTHER LONG TERM (CURRENT) DRUG THERAPY: ICD-10-CM

## 2024-05-27 DIAGNOSIS — E03.9 ACQUIRED HYPOTHYROIDISM: ICD-10-CM

## 2024-05-27 DIAGNOSIS — R00.1 BRADYCARDIA: ICD-10-CM

## 2024-05-27 DIAGNOSIS — M81.0 AGE-RELATED OSTEOPOROSIS WITHOUT CURRENT PATHOLOGICAL FRACTURE: Primary | ICD-10-CM

## 2024-05-27 PROCEDURE — 99215 OFFICE O/P EST HI 40 MIN: CPT | Mod: S$GLB,,, | Performed by: HOSPITALIST

## 2024-05-27 PROCEDURE — 3078F DIAST BP <80 MM HG: CPT | Mod: CPTII,S$GLB,, | Performed by: HOSPITALIST

## 2024-05-27 PROCEDURE — 99999 PR PBB SHADOW E&M-EST. PATIENT-LVL IV: CPT | Mod: PBBFAC,,, | Performed by: HOSPITALIST

## 2024-05-27 PROCEDURE — 3077F SYST BP >= 140 MM HG: CPT | Mod: CPTII,S$GLB,, | Performed by: HOSPITALIST

## 2024-05-27 PROCEDURE — 1125F AMNT PAIN NOTED PAIN PRSNT: CPT | Mod: CPTII,S$GLB,, | Performed by: HOSPITALIST

## 2024-05-27 RX ORDER — LORATADINE 10 MG/1
10 TABLET ORAL DAILY
Qty: 90 TABLET | Refills: 3 | Status: SHIPPED | OUTPATIENT
Start: 2024-05-27 | End: 2024-06-16 | Stop reason: SDUPTHER

## 2024-05-27 RX ORDER — ALENDRONATE SODIUM 70 MG/1
70 TABLET ORAL
Qty: 12 TABLET | Refills: 3 | Status: SHIPPED | OUTPATIENT
Start: 2024-05-27 | End: 2025-05-27

## 2024-05-27 NOTE — LETTER
May 27, 2024          No Recipients             [facility] 65[facility]  [address]  [facility] [address]  Phone: [phone]  Fax: [phone]   Patient: Adri Samayoa   MR Number: [id number]   YOB: 1940   Date of Visit: 5/27/2024     To whom it may concern,     I recently started caring for Ms. Adri Samayoa in the capacity of her primary care physician and recently evaluated her in anticipation of upcoming dental extractions.  She was noted to be significantly bradycardic with a pulse in the 30s, but she is asymptomatic.  I think it would be reasonable to pursue with the extractions so that dentures can be fitted, provided the procedure is done with local anesthetics only.  Due to her bradycardia, impaired cognitive functioning, as well as some of the medications she takes systemic sedatives or anesthetics would have an increased risk of harm.  Feel free to contact me for any clarifications or other questions if needed.      Sincerely,      Len Boyer MD            CC    No Recipients    Enclosure

## 2024-05-27 NOTE — PROGRESS NOTES
"Primary Care Provider Appointment - [facility]  Len Boyer MD      Subjective:      Patient ID: Adri Samayoa is a 83 y.o. female with   Past Medical History:   Diagnosis Date    Dementia            Chief Complaint: Arm Pain and Follow-up    Prior to this visit, patient's last encounter with PCP was 5/13/2024.    Bethany reports pt has been having RUE pain when attempting to use it such as lifting herself up or touching her face.  Pt is intermittently verbally abuse to her daughter.    5/13: Pt accompanied by daughter Bethany who provides hx.  Was seeing a "Dr. S" at [facility] who left and they didn't want to start w/ a new dr there; this location is more convenient.  Needs medical clearance for dental procedures; insurance changed and prior dentist no longer in-network so they recently established w/ another dentist.  Sees a neurologist Dr. Godinez at [facility] for dementia.  Recently started on clomipramine for OCD w/ some mild improvement but makes her drowsy so not giving BID as prescribed.  On Namenda and Aricept.  Ambulates w/ walker in home, WC out of home.  Some difficulty getting to appointments.  Eating pureed diet; good appetite.  Sleeping well.  Occasional constipation but has PRNs that are effective.  Lives w/ Bethany; other daughter Evangelina helps as well.        4Ms for Medical Decision-Making in Older Adults    Last Completed EAWV: None    MOBILITY:  Get Up and Go:       No data to display              Activities of Daily Living:       No data to display              Whisper Test:       No data to display              Disability Status:       No data to display              Nutrition Screening:       No data to display             Screening Score: 0-7 Malnourished, 8-11 At Risk, 12-14 Normal    MENTATION:   Depression Patient Health Questionnaire:      5/13/2024     1:09 PM   Depression Patient Health Questionnaire   Over the last two weeks how often have you been " bothered by little interest or pleasure in doing things Not at all   Over the last two weeks how often have you been bothered by feeling down, depressed or hopeless Not at all   PHQ-2 Total Score 0     Has Dementia Dx: Yes    Cognitive Function Screening:       No data to display              Cognitive Function Screening Total - Less than 4 = Abnormal,  Greater than or equal to 4 = Normal    MEDICATIONS:  High Risk Medications:  Total Active Medications: 1  clomiPRAMINE Cap - 25 MG    WHAT MATTERS MOST:  Advance Care Planning   ACP Status:   Patient has had an ACP conversation  Living Will: No  Power of : No  LaPOST: No             Social History     Socioeconomic History    Marital status:    Tobacco Use    Smoking status: Never    Smokeless tobacco: Never   Substance and Sexual Activity    Alcohol use: Never    Drug use: Never    Sexual activity: Not Currently     Partners: Male       Review of Systems   Unable to perform ROS: Dementia        Objective:   BP (!) 140/70   Pulse (!) 31   Temp 98.9 °F (37.2 °C) (Oral)   Wt 59 kg (130 lb 1.1 oz)   SpO2 97%   BMI 23.04 kg/m²   Repeat pulse measured by myself 54.    Physical Exam  Vitals reviewed.   Constitutional:       General: She is not in acute distress.     Comments: Pt seen in WC   HENT:      Head: Normocephalic and atraumatic.      Right Ear: External ear normal. There is impacted cerumen.      Left Ear: External ear normal. There is impacted cerumen.      Nose: Nose normal.      Mouth/Throat:      Mouth: Mucous membranes are moist.      Pharynx: Oropharynx is clear.      Comments: Edentulous save for 3 teeth, which are in poor condition.  Eyes:      General: No scleral icterus.     Extraocular Movements: Extraocular movements intact.      Conjunctiva/sclera: Conjunctivae normal.      Pupils: Pupils are equal, round, and reactive to light.   Cardiovascular:      Rate and Rhythm: Regular rhythm. Bradycardia present.      Pulses: Normal pulses.  "     Heart sounds: Normal heart sounds.   Pulmonary:      Effort: Pulmonary effort is normal.      Breath sounds: Normal breath sounds.   Abdominal:      General: Bowel sounds are normal. There is no distension.      Tenderness: There is no abdominal tenderness. There is no guarding.   Musculoskeletal:      Cervical back: Normal range of motion and neck supple. No rigidity.      Right lower leg: Edema (mild pitting) present.      Left lower leg: Edema (mild pitting) present.   Lymphadenopathy:      Cervical: No cervical adenopathy.   Skin:     General: Skin is warm and dry.      Findings: No rash.      Comments: Pt on tirade of verbal abuse towards her daughter the entire visit; she eventually has to leave the room for a while to compose herself.   Neurological:      Mental Status: She is alert.      Comments: Oriented to self only.  Often needs prompting from daughter to respond to questions and then attends to speaker.  Able to follow some simple commands.  Slight "pill-rolling" tremor of right thumb and 2nd digit noted.        EKG done in office and reviewed personally shows heart rate 36, QTC in low 400s, right bundle branch block, and low amplitude.  No prior tracings available for comparison.      No results found for: "WBC", "HGB", "HCT", "PLT", "CHOL", "TRIG", "HDL", "LDLDIRECT", "ALT", "AST", "NA", "K", "CL", "CREATININE", "BUN", "CO2", "TSH", "PSA", "INR", "GLUF", "HGBA1C", "MICROALBUR"    Current Outpatient Medications on File Prior to Visit   Medication Sig Dispense Refill    alendronate (FOSAMAX) 70 MG tablet Take 70 mg by mouth every 7 days.      ARICEPT 10 mg tablet       latanoprost 0.005 % ophthalmic solution Place 1 drop into both eyes every evening.      loratadine (CLARITIN) 10 mg tablet TAKE 1 TABLET(10 MG) BY MOUTH EVERY DAY 90 tablet 0    memantine (NAMENDA) 10 MG Tab Take 10 mg by mouth 2 (two) times daily.      naproxen (NAPROSYN) 500 MG tablet       vitamin D (VITAMIN D3) 1000 units Tab " Take 2 tablets (2,000 Units total) by mouth once daily. 180 tablet 3    clomiPRAMINE (ANAFRANIL) 25 MG Cap Take 25 mg by mouth once daily. (Patient not taking: Reported on 5/27/2024)       No current facility-administered medications on file prior to visit.           Assessment:   83 y.o. female with multiple co-morbid illnesses here to follow-up for ongoing chronic disease management and preventive health maintenance.    Plan:     Problem List Items Addressed This Visit       Hypothyroidism     Will check TSH to evaluate as possible cause of bradycardia.         Relevant Orders    Hemoglobin A1C (Completed)    TSH (Completed)    Hyperlipidemia     Due to update lipid panel.         Relevant Orders    Lipid Panel (Completed)    Dementia with behavioral disturbance     Pt verbally abuse towards her daughter today, which is not uncommon.  Offered reassurance that she likely doesn't know what she is saying and doesn't really mean the hurtful things she is saying.  She sees a neurologist who manages her cognitive/psychiatric medications.         Bradycardia     Persistent bradycardia despite stopping clomipiramine; suggesting this may be either an intrinsic conduction defect or due to another medication.  Discussed given her asymptomatic status and overall frail condition that treatment such as an implantable pacemaker may do more harm than good.  Pt's daughter would still like to see the cardiologist here to discuss further.  Will refer to Dr. JOEL Gonzalez who sees patients here.  In the meantime I would  against any dental procedures under sedation.  If extraction will facilitate her being fitted for dentures that will improve her ability to eat and maintain her nutritional status extractions under local anesthetic would probably offer benefit outweighing risk.         Relevant Orders    Ambulatory referral/consult to Cardiology    Comprehensive Metabolic Panel (Completed)    CBC Auto Differential (Completed)     Age-related osteoporosis without current pathological fracture - Primary     Refill for alendronate sent.         Relevant Medications    alendronate (FOSAMAX) 70 MG tablet    Pain of right upper extremity     Pain appears to be general and difficult to localize or characterize.  Will check X-ray of humerus to rule out noncomminuted fracture.         Relevant Orders    X-Ray Humerus 2 View Right    Itching     Will trial loratadine daily prn itching.          Other Visit Diagnoses       Other long term (current) drug therapy        Relevant Orders    Hemoglobin A1C (Completed)            Health Maintenance         Date Due Completion Date    TETANUS VACCINE Never done ---    Shingles Vaccine (1 of 2) Never done ---    RSV Vaccine (Age 60+ and Pregnant patients) (1 - 1-dose 60+ series) Never done ---    Pneumococcal Vaccines (Age 65+) (1 of 1 - PCV) Never done ---    COVID-19 Vaccine (3 - 2023-24 season) 09/01/2023 6/14/2021    Lipid Panel 06/24/2024 6/24/2019    Influenza Vaccine (Season Ended) 09/01/2024 ---            No future appointments.        Follow up in about 2 weeks (around 6/10/2024). Total clinical care time was 40 min.    Len Boyer MD  [facility]    This note was partially dictated using voice recognition software and although actively proofread during transcription, it is possible some errors in transcription may have been overlooked.

## 2024-05-28 ENCOUNTER — PATIENT MESSAGE (OUTPATIENT)
Facility: CLINIC | Age: 84
End: 2024-05-28
Payer: MEDICARE

## 2024-05-28 ENCOUNTER — TELEPHONE (OUTPATIENT)
Dept: PRIMARY CARE CLINIC | Facility: CLINIC | Age: 84
End: 2024-05-28
Payer: MEDICARE

## 2024-06-02 DIAGNOSIS — L29.9 ITCHING: ICD-10-CM

## 2024-06-02 RX ORDER — LORATADINE 10 MG/1
10 TABLET ORAL
Qty: 90 TABLET | Refills: 3 | OUTPATIENT
Start: 2024-06-02

## 2024-06-02 NOTE — TELEPHONE ENCOUNTER
Refill Decision Note   Adri Samayoa  is requesting a refill authorization.  Brief Assessment and Rationale for Refill:  Quick Discontinue     Medication Therapy Plan: E-Prescribing Status: Receipt confirmed by pharmacy (5/27/2024  3:19 PM CDT)      Comments:     Note composed:4:07 PM 06/02/2024

## 2024-06-16 DIAGNOSIS — L29.9 ITCHING: ICD-10-CM

## 2024-06-17 RX ORDER — LORATADINE 10 MG/1
10 TABLET ORAL DAILY
Qty: 90 TABLET | Refills: 3 | Status: SHIPPED | OUTPATIENT
Start: 2024-06-17 | End: 2025-06-17

## 2024-06-18 ENCOUNTER — TELEPHONE (OUTPATIENT)
Facility: CLINIC | Age: 84
End: 2024-06-18
Payer: MEDICARE

## 2024-06-18 NOTE — TELEPHONE ENCOUNTER
----- Message from Chico Shabazz sent at 6/17/2024 11:19 PM CDT -----    ----- Message -----  From: Piedad Lee  Sent: 6/15/2024   1:56 PM CDT  To: [facility] Staff    Type: General Call Back    Name of Caller:ELI MTZ [[id number]]  Reason orders for xray  Would the patient rather a call back or a response via [facility]? call  Best Call Back Number: [phone]  Additional Information: Patient requesting orders for xray

## 2024-07-30 NOTE — ASSESSMENT & PLAN NOTE
Bradycardia confirmed on EKG.  She is asymptomatic.  She does not take any AV yves blocking agents; is possible 1 of her other medications could be contributing to this.  We will try stopping clomipramine has been cautioned above reassess promptly.  Patient's daughter counseled on risk versus benefit of aggressive management of bradycardia which may lead to pacemaker placement which could be of limited utility and possibly result in harm.

## 2024-07-30 NOTE — ASSESSMENT & PLAN NOTE
Not on levothyroxine.  We will check TSH along with other basic labs to determine if hypothyroidism could possibly be resulting in bradycardia.

## 2024-08-06 ENCOUNTER — OFFICE VISIT (OUTPATIENT)
Dept: CARDIOLOGY | Facility: CLINIC | Age: 84
End: 2024-08-06
Payer: MEDICARE

## 2024-08-06 VITALS — SYSTOLIC BLOOD PRESSURE: 126 MMHG | DIASTOLIC BLOOD PRESSURE: 58 MMHG | HEART RATE: 41 BPM | OXYGEN SATURATION: 98 %

## 2024-08-06 DIAGNOSIS — E78.5 HYPERLIPIDEMIA, UNSPECIFIED HYPERLIPIDEMIA TYPE: ICD-10-CM

## 2024-08-06 DIAGNOSIS — R01.1 UNDIAGNOSED CARDIAC MURMURS: Primary | ICD-10-CM

## 2024-08-06 DIAGNOSIS — R00.1 BRADYCARDIA: ICD-10-CM

## 2024-08-06 DIAGNOSIS — I44.30 AV BLOCK: ICD-10-CM

## 2024-08-06 PROCEDURE — 3074F SYST BP LT 130 MM HG: CPT | Mod: CPTII,S$GLB,, | Performed by: INTERNAL MEDICINE

## 2024-08-06 PROCEDURE — 1126F AMNT PAIN NOTED NONE PRSNT: CPT | Mod: CPTII,S$GLB,, | Performed by: INTERNAL MEDICINE

## 2024-08-06 PROCEDURE — 1159F MED LIST DOCD IN RCRD: CPT | Mod: CPTII,S$GLB,, | Performed by: INTERNAL MEDICINE

## 2024-08-06 PROCEDURE — 99204 OFFICE O/P NEW MOD 45 MIN: CPT | Mod: S$GLB,,, | Performed by: INTERNAL MEDICINE

## 2024-08-06 PROCEDURE — 3078F DIAST BP <80 MM HG: CPT | Mod: CPTII,S$GLB,, | Performed by: INTERNAL MEDICINE

## 2024-08-06 PROCEDURE — 3288F FALL RISK ASSESSMENT DOCD: CPT | Mod: CPTII,S$GLB,, | Performed by: INTERNAL MEDICINE

## 2024-08-06 PROCEDURE — 1101F PT FALLS ASSESS-DOCD LE1/YR: CPT | Mod: CPTII,S$GLB,, | Performed by: INTERNAL MEDICINE

## 2024-08-06 PROCEDURE — 99999 PR PBB SHADOW E&M-EST. PATIENT-LVL III: CPT | Mod: PBBFAC,,, | Performed by: INTERNAL MEDICINE

## 2024-08-26 PROBLEM — L29.9 ITCHING: Status: ACTIVE | Noted: 2024-08-26

## 2024-08-26 PROBLEM — M79.601 PAIN OF RIGHT UPPER EXTREMITY: Status: ACTIVE | Noted: 2024-08-26

## 2024-08-26 PROBLEM — M81.0 AGE-RELATED OSTEOPOROSIS WITHOUT CURRENT PATHOLOGICAL FRACTURE: Status: ACTIVE | Noted: 2024-08-26

## 2024-08-26 NOTE — ASSESSMENT & PLAN NOTE
Pt verbally abuse towards her daughter today, which is not uncommon.  Offered reassurance that she likely doesn't know what she is saying and doesn't really mean the hurtful things she is saying.  She sees a neurologist who manages her cognitive/psychiatric medications.

## 2024-08-26 NOTE — ASSESSMENT & PLAN NOTE
Pain appears to be general and difficult to localize or characterize.  Will check X-ray of humerus to rule out noncomminuted fracture.

## 2024-08-26 NOTE — ASSESSMENT & PLAN NOTE
Persistent bradycardia despite stopping clomipiramine; suggesting this may be either an intrinsic conduction defect or due to another medication.  Discussed given her asymptomatic status and overall frail condition that treatment such as an implantable pacemaker may do more harm than good.  Pt's daughter would still like to see the cardiologist here to discuss further.  Will refer to Dr. JOEL Gonzalez who sees patients here.  In the meantime I would  against any dental procedures under sedation.  If extraction will facilitate her being fitted for dentures that will improve her ability to eat and maintain her nutritional status extractions under local anesthetic would probably offer benefit outweighing risk.

## 2024-08-27 DIAGNOSIS — R00.1 BRADYCARDIA: Primary | ICD-10-CM

## 2024-10-10 ENCOUNTER — TELEPHONE (OUTPATIENT)
Facility: CLINIC | Age: 84
End: 2024-10-10
Payer: MEDICARE

## 2024-10-10 NOTE — TELEPHONE ENCOUNTER
----- Message from Bethany sent at 10/10/2024  9:54 AM CDT -----  Contact: [phone]  Pt is requesting to rs virtual appt on today. Schedule is closed for me    Please call pt and advise

## 2024-11-22 ENCOUNTER — TELEPHONE (OUTPATIENT)
Facility: CLINIC | Age: 84
End: 2024-11-22
Payer: MEDICARE

## 2025-07-09 DIAGNOSIS — M81.0 AGE-RELATED OSTEOPOROSIS WITHOUT CURRENT PATHOLOGICAL FRACTURE: ICD-10-CM

## 2025-07-09 RX ORDER — ALENDRONATE SODIUM 70 MG/1
70 TABLET ORAL
Qty: 12 TABLET | Refills: 3 | Status: SHIPPED | OUTPATIENT
Start: 2025-07-09 | End: 2026-07-09